# Patient Record
Sex: FEMALE | Race: BLACK OR AFRICAN AMERICAN | NOT HISPANIC OR LATINO | Employment: FULL TIME | URBAN - METROPOLITAN AREA
[De-identification: names, ages, dates, MRNs, and addresses within clinical notes are randomized per-mention and may not be internally consistent; named-entity substitution may affect disease eponyms.]

---

## 2024-05-31 RX ORDER — FEXOFENADINE HCL 60 MG
TABLET ORAL
COMMUNITY
Start: 2021-02-05 | End: 2024-05-31 | Stop reason: ALTCHOICE

## 2024-05-31 RX ORDER — PANTOPRAZOLE SODIUM 40 MG/1
1 TABLET, DELAYED RELEASE ORAL DAILY
COMMUNITY

## 2024-05-31 RX ORDER — CETIRIZINE HYDROCHLORIDE 10 MG/1
10 TABLET ORAL AS NEEDED
COMMUNITY

## 2024-05-31 RX ORDER — MONTELUKAST SODIUM 4 MG/1
1 TABLET, CHEWABLE ORAL 2 TIMES DAILY
COMMUNITY
Start: 2023-06-05

## 2024-05-31 RX ORDER — ETONOGESTREL AND ETHINYL ESTRADIOL VAGINAL RING .015; .12 MG/D; MG/D
RING VAGINAL
COMMUNITY
Start: 2024-02-08

## 2024-05-31 RX ORDER — FLUTICASONE PROPIONATE 50 MCG
2 SPRAY, SUSPENSION (ML) NASAL DAILY
COMMUNITY

## 2024-07-17 ENCOUNTER — TELEPHONE (OUTPATIENT)
Dept: PRIMARY CARE | Facility: CLINIC | Age: 39
End: 2024-07-17

## 2024-07-17 ENCOUNTER — TELEMEDICINE (OUTPATIENT)
Dept: PRIMARY CARE | Facility: CLINIC | Age: 39
End: 2024-07-17
Payer: COMMERCIAL

## 2024-07-17 DIAGNOSIS — L50.9 URTICARIA OF ENTIRE BODY: Primary | ICD-10-CM

## 2024-07-17 DIAGNOSIS — K21.00 GASTROESOPHAGEAL REFLUX DISEASE WITH ESOPHAGITIS WITHOUT HEMORRHAGE: Chronic | ICD-10-CM

## 2024-07-17 PROBLEM — J30.9 CHRONIC ALLERGIC RHINITIS: Status: ACTIVE | Noted: 2024-07-17

## 2024-07-17 PROBLEM — K91.89 POSTCHOLECYSTECTOMY DIARRHEA: Status: ACTIVE | Noted: 2024-07-17

## 2024-07-17 PROBLEM — R19.7 POSTCHOLECYSTECTOMY DIARRHEA: Status: ACTIVE | Noted: 2024-07-17

## 2024-07-17 PROCEDURE — 99203 OFFICE O/P NEW LOW 30 MIN: CPT | Performed by: INTERNAL MEDICINE

## 2024-07-17 RX ORDER — METHYLPREDNISOLONE 4 MG/1
TABLET ORAL
Qty: 21 TABLET | Refills: 0 | Status: SHIPPED | OUTPATIENT
Start: 2024-07-17

## 2024-07-17 ASSESSMENT — COLUMBIA-SUICIDE SEVERITY RATING SCALE - C-SSRS
6. HAVE YOU EVER DONE ANYTHING, STARTED TO DO ANYTHING, OR PREPARED TO DO ANYTHING TO END YOUR LIFE?: NO
1. IN THE PAST MONTH, HAVE YOU WISHED YOU WERE DEAD OR WISHED YOU COULD GO TO SLEEP AND NOT WAKE UP?: NO
2. HAVE YOU ACTUALLY HAD ANY THOUGHTS OF KILLING YOURSELF?: NO

## 2024-07-17 ASSESSMENT — PATIENT HEALTH QUESTIONNAIRE - PHQ9
SUM OF ALL RESPONSES TO PHQ9 QUESTIONS 1 AND 2: 0
2. FEELING DOWN, DEPRESSED OR HOPELESS: NOT AT ALL
2. FEELING DOWN, DEPRESSED OR HOPELESS: NOT AT ALL
SUM OF ALL RESPONSES TO PHQ9 QUESTIONS 1 AND 2: 0
1. LITTLE INTEREST OR PLEASURE IN DOING THINGS: NOT AT ALL
1. LITTLE INTEREST OR PLEASURE IN DOING THINGS: NOT AT ALL

## 2024-07-17 ASSESSMENT — ENCOUNTER SYMPTOMS
LOSS OF SENSATION IN FEET: 0
TROUBLE SWALLOWING: 0
CHILLS: 1
DIARRHEA: 0
VOICE CHANGE: 0
OCCASIONAL FEELINGS OF UNSTEADINESS: 0
NAUSEA: 0
FEVER: 0
DEPRESSION: 0

## 2024-07-17 NOTE — ASSESSMENT & PLAN NOTE
Medication spironolactone 25 mg  Take one half tablet by mouth daily  Last office visit 3/12/21  Last Refill 9/15/20  Next office visit 9/14/21  Labs 3/12/21    Refilled    Unlcear ethiology , post infection or food related, treatment with steroid, and h2  and H1 anti histamminics

## 2024-07-17 NOTE — PROGRESS NOTES
Subjective   Patient ID: Makenzie Vu is a 38 y.o. female who presents for covid postive. With hives    HPI 4 days ago started with sore throat, cough, headache, nasal congestion, tested for covid 2 days ago. Last night she observed 3 hives on right arm, today she observed also in other limbs, , abdominal wall and few in her back. Scalp is itching as well.  She was out of town, dinning out frequently , more spicy and new flavors food, return last night.   She feels currently better from covid symptoms, .  OTC mucinex.    Review of Systems   Constitutional:  Positive for chills. Negative for fever.   HENT:  Negative for trouble swallowing and voice change.    Gastrointestinal:  Negative for diarrhea and nausea.   All other systems reviewed and are negative.      Objective   There were no vitals taken for this visit.    Physical Exam  Oriented x 3 able to provide all medical information no acute respiratory distress   Assessment/Plan   Problem List Items Addressed This Visit             ICD-10-CM    Urticaria of entire body - Primary L50.9     Unlcear ethiology , post infection or food related, treatment with steroid, and h2  and H1 anti histamminics         Relevant Medications    methylPREDNISolone (Medrol Dospak) 4 mg tablets     Other Visit Diagnoses         Codes    Gastroesophageal reflux disease with esophagitis without hemorrhage  (Chronic)    K21.00               
Ambulatory

## 2024-07-17 NOTE — PATIENT INSTRUCTIONS
Star prednisone with food and early in the day, at bed time take  25 to 50 mg of benadryl , and along with your pantorprazole, you also take pepcid ac for about 1 week. Call office as needed. We will see you in September for physical

## 2024-09-13 ENCOUNTER — APPOINTMENT (OUTPATIENT)
Dept: PRIMARY CARE | Facility: CLINIC | Age: 39
End: 2024-09-13
Payer: COMMERCIAL

## 2024-09-23 ASSESSMENT — PROMIS GLOBAL HEALTH SCALE
RATE_AVERAGE_FATIGUE: MODERATE
RATE_GENERAL_HEALTH: GOOD
EMOTIONAL_PROBLEMS: RARELY
RATE_PHYSICAL_HEALTH: GOOD
RATE_QUALITY_OF_LIFE: VERY GOOD
RATE_AVERAGE_PAIN: 1
RATE_MENTAL_HEALTH: VERY GOOD
RATE_SOCIAL_SATISFACTION: VERY GOOD
CARRYOUT_PHYSICAL_ACTIVITIES: COMPLETELY
CARRYOUT_SOCIAL_ACTIVITIES: VERY GOOD

## 2024-09-24 ENCOUNTER — APPOINTMENT (OUTPATIENT)
Dept: PRIMARY CARE | Facility: CLINIC | Age: 39
End: 2024-09-24
Payer: COMMERCIAL

## 2024-09-24 ENCOUNTER — LAB (OUTPATIENT)
Dept: LAB | Facility: LAB | Age: 39
End: 2024-09-24
Payer: COMMERCIAL

## 2024-09-24 VITALS
DIASTOLIC BLOOD PRESSURE: 60 MMHG | HEART RATE: 60 BPM | SYSTOLIC BLOOD PRESSURE: 112 MMHG | WEIGHT: 192.68 LBS | OXYGEN SATURATION: 100 % | BODY MASS INDEX: 34.14 KG/M2 | RESPIRATION RATE: 16 BRPM | HEIGHT: 63 IN

## 2024-09-24 DIAGNOSIS — R19.7 POSTCHOLECYSTECTOMY DIARRHEA: Primary | ICD-10-CM

## 2024-09-24 DIAGNOSIS — G93.89 FRONTAL MASS OF BRAIN: ICD-10-CM

## 2024-09-24 DIAGNOSIS — G93.89 FRONTAL MASS OF BRAIN: Primary | ICD-10-CM

## 2024-09-24 DIAGNOSIS — F40.240 CLAUSTROPHOBIA: ICD-10-CM

## 2024-09-24 DIAGNOSIS — K91.89 POSTCHOLECYSTECTOMY DIARRHEA: Primary | ICD-10-CM

## 2024-09-24 DIAGNOSIS — Z00.00 PHYSICAL EXAM, ANNUAL: ICD-10-CM

## 2024-09-24 DIAGNOSIS — K21.00 GASTROESOPHAGEAL REFLUX DISEASE WITH ESOPHAGITIS WITHOUT HEMORRHAGE: ICD-10-CM

## 2024-09-24 LAB
25(OH)D3 SERPL-MCNC: 64 NG/ML (ref 30–100)
ALBUMIN SERPL BCP-MCNC: 3.8 G/DL (ref 3.4–5)
ALP SERPL-CCNC: 68 U/L (ref 33–110)
ALT SERPL W P-5'-P-CCNC: 49 U/L (ref 7–45)
ANION GAP SERPL CALC-SCNC: 13 MMOL/L (ref 10–20)
AST SERPL W P-5'-P-CCNC: 36 U/L (ref 9–39)
BILIRUB SERPL-MCNC: 0.4 MG/DL (ref 0–1.2)
BUN SERPL-MCNC: 9 MG/DL (ref 6–23)
CALCIUM SERPL-MCNC: 9.2 MG/DL (ref 8.6–10.6)
CHLORIDE SERPL-SCNC: 108 MMOL/L (ref 98–107)
CHOLEST SERPL-MCNC: 172 MG/DL (ref 0–199)
CHOLESTEROL/HDL RATIO: 2.4
CO2 SERPL-SCNC: 24 MMOL/L (ref 21–32)
CREAT SERPL-MCNC: 0.89 MG/DL (ref 0.5–1.05)
EGFRCR SERPLBLD CKD-EPI 2021: 85 ML/MIN/1.73M*2
ERYTHROCYTE [DISTWIDTH] IN BLOOD BY AUTOMATED COUNT: 14.3 % (ref 11.5–14.5)
EST. AVERAGE GLUCOSE BLD GHB EST-MCNC: 111 MG/DL
GLUCOSE SERPL-MCNC: 87 MG/DL (ref 74–99)
HBA1C MFR BLD: 5.5 %
HCT VFR BLD AUTO: 42.7 % (ref 36–46)
HDLC SERPL-MCNC: 70.7 MG/DL
HGB BLD-MCNC: 13.3 G/DL (ref 12–16)
LDLC SERPL CALC-MCNC: 81 MG/DL
MCH RBC QN AUTO: 26.2 PG (ref 26–34)
MCHC RBC AUTO-ENTMCNC: 31.1 G/DL (ref 32–36)
MCV RBC AUTO: 84 FL (ref 80–100)
NON HDL CHOLESTEROL: 101 MG/DL (ref 0–149)
NRBC BLD-RTO: 0 /100 WBCS (ref 0–0)
PLATELET # BLD AUTO: 260 X10*3/UL (ref 150–450)
POTASSIUM SERPL-SCNC: 4.4 MMOL/L (ref 3.5–5.3)
PROT SERPL-MCNC: 7.3 G/DL (ref 6.4–8.2)
RBC # BLD AUTO: 5.08 X10*6/UL (ref 4–5.2)
SODIUM SERPL-SCNC: 141 MMOL/L (ref 136–145)
TRIGL SERPL-MCNC: 104 MG/DL (ref 0–149)
TSH SERPL-ACNC: 0.57 MIU/L (ref 0.44–3.98)
VLDL: 21 MG/DL (ref 0–40)
WBC # BLD AUTO: 5.7 X10*3/UL (ref 4.4–11.3)

## 2024-09-24 PROCEDURE — 80061 LIPID PANEL: CPT

## 2024-09-24 PROCEDURE — 80053 COMPREHEN METABOLIC PANEL: CPT

## 2024-09-24 PROCEDURE — 84443 ASSAY THYROID STIM HORMONE: CPT

## 2024-09-24 PROCEDURE — 85027 COMPLETE CBC AUTOMATED: CPT

## 2024-09-24 PROCEDURE — 36415 COLL VENOUS BLD VENIPUNCTURE: CPT

## 2024-09-24 PROCEDURE — 1036F TOBACCO NON-USER: CPT | Performed by: INTERNAL MEDICINE

## 2024-09-24 PROCEDURE — 99395 PREV VISIT EST AGE 18-39: CPT | Performed by: INTERNAL MEDICINE

## 2024-09-24 PROCEDURE — 3008F BODY MASS INDEX DOCD: CPT | Performed by: INTERNAL MEDICINE

## 2024-09-24 PROCEDURE — 82306 VITAMIN D 25 HYDROXY: CPT

## 2024-09-24 PROCEDURE — 90471 IMMUNIZATION ADMIN: CPT | Performed by: INTERNAL MEDICINE

## 2024-09-24 PROCEDURE — 83036 HEMOGLOBIN GLYCOSYLATED A1C: CPT

## 2024-09-24 PROCEDURE — 90656 IIV3 VACC NO PRSV 0.5 ML IM: CPT | Performed by: INTERNAL MEDICINE

## 2024-09-24 RX ORDER — LORAZEPAM 0.5 MG/1
TABLET ORAL
Qty: 2 TABLET | Refills: 0 | Status: SHIPPED | OUTPATIENT
Start: 2024-09-24

## 2024-09-24 RX ORDER — FAMOTIDINE 20 MG/1
TABLET, FILM COATED ORAL
Qty: 30 TABLET | Refills: 3 | Status: SHIPPED | OUTPATIENT
Start: 2024-09-24 | End: 2024-09-24

## 2024-09-24 RX ORDER — FAMOTIDINE 20 MG/1
TABLET, FILM COATED ORAL
Qty: 90 TABLET | Refills: 1 | Status: SHIPPED | OUTPATIENT
Start: 2024-09-24

## 2024-09-24 RX ORDER — FAMOTIDINE 20 MG/1
TABLET, FILM COATED ORAL
Qty: 60 TABLET | Refills: 1 | Status: SHIPPED | OUTPATIENT
Start: 2024-09-24 | End: 2024-09-24 | Stop reason: SDUPTHER

## 2024-09-24 ASSESSMENT — ENCOUNTER SYMPTOMS
WHEEZING: 0
OCCASIONAL FEELINGS OF UNSTEADINESS: 0
PALPITATIONS: 0
CHEST TIGHTNESS: 0
ARTHRALGIAS: 0
BLOOD IN STOOL: 0
DEPRESSION: 0
LOSS OF SENSATION IN FEET: 0

## 2024-09-24 ASSESSMENT — COLUMBIA-SUICIDE SEVERITY RATING SCALE - C-SSRS
2. HAVE YOU ACTUALLY HAD ANY THOUGHTS OF KILLING YOURSELF?: NO
6. HAVE YOU EVER DONE ANYTHING, STARTED TO DO ANYTHING, OR PREPARED TO DO ANYTHING TO END YOUR LIFE?: NO
1. IN THE PAST MONTH, HAVE YOU WISHED YOU WERE DEAD OR WISHED YOU COULD GO TO SLEEP AND NOT WAKE UP?: NO

## 2024-09-24 ASSESSMENT — PATIENT HEALTH QUESTIONNAIRE - PHQ9
SUM OF ALL RESPONSES TO PHQ9 QUESTIONS 1 AND 2: 0
1. LITTLE INTEREST OR PLEASURE IN DOING THINGS: NOT AT ALL
2. FEELING DOWN, DEPRESSED OR HOPELESS: NOT AT ALL

## 2024-09-24 NOTE — PATIENT INSTRUCTIONS
Increase vegetables daily. Keep working on reduction of sweets.Please avoid dehydration, be consistent with your bed time very day of the week, check and avoid any food that may trigger headaches, maintain average 70 oz of water, complete eye exam soon, see gastroenterologist, today you received flu vaccine, return here in 4 months.     none

## 2024-09-24 NOTE — ASSESSMENT & PLAN NOTE
Discussed diet, exercise, immunizations, and screening appropriate for their age.  Mammogram: never  PAP: Maral Choudhury

## 2024-09-24 NOTE — ASSESSMENT & PLAN NOTE
>>ASSESSMENT AND PLAN FOR FRONTAL MASS OF BRAIN WRITTEN ON 9/24/2024  2:27 PM BY VIKTOR YO MD    MRI brain with and without ordered.

## 2024-09-24 NOTE — PROGRESS NOTES
"Subjective   Patient ID: Makenzie Vu is a 39 y.o. female who presents for Annual Exam., new patient    HPI She eats 3 meals, school day greek yogurt/granola, summer morning egg/pancakes, lunch is pack for school year with salad/protein, or left overs or turkey sanwich with fruits as snacks, Dinner home cooking for her protein ( chicken, ocassionally pork or beef), vegetables, starch,  3 to 4 and dinning out fast food with chicken sandwich. Fresh vegetables/salad one serving/ week. Sweets recently less amount trying to get to only 2 x week. ETOH 2 x week , more during summer time.  Exercises with weights at home, about 30 min plus she walks at work , about 5,000 steps daily.  Her urticaria related to covid resolved w steroid and none since mid July.  Last few months she started with more indigestion, more heartburn, bm less frequent, she link to use of colestipol, She also uses pantoprazole daily and PRN pepcid/tums.    Review of Systems   HENT:          Well control rhinorrea with fluticasone year around   Respiratory:  Negative for chest tightness and wheezing.    Cardiovascular:  Negative for chest pain and palpitations.   Gastrointestinal:  Negative for blood in stool.   Musculoskeletal:  Negative for arthralgias.   Neurological:         2 x week tensional headache, some times wakes up with or while at work. Uses acetaminophen , second option excedrin migraine. However she recalls over 10 years ago intense migraines while working in very stressful environment and MRI of brain was done, with some abnormal findings. She never return for follow up.   Psychiatric/Behavioral:          Sleep interrupted during working days due to daughter poor sleep   All other systems reviewed and are negative.      Objective   /60 (BP Location: Left arm, Patient Position: Sitting, BP Cuff Size: Adult)   Pulse 60   Resp 16   Ht 1.592 m (5' 2.67\")   Wt 87.4 kg (192 lb 10.9 oz)   SpO2 100%   BMI 34.49 kg/m²     Physical " Exam  Eyes - conjunctivae clear, PERRLA  HEENT - no impacted wax  Neck - no cervical lymphadenopathy, no thyromegaly  Axilla - no palpable lymphadenopathy  Cardiac- regular rate and rhythm, no murmurs, no carotid bruit, no JVP  Lung - clear to auscultation, no rales, no rhonchi, no wheezing  GI - normally active bowel sounds, non tender, non distended, no hepatosplenomegaly, no rebound  MSK - non deformities  Extremities - no edema, good distal pulses  Neuro - non focal, oriented x 3  Skin - no bruises, no rashes  Psychiatric - pleasant, well groom, no hallucinations    Assessment/Plan   Problem List Items Addressed This Visit             ICD-10-CM    Postcholecystectomy diarrhea - Primary K91.89, R19.7     Advice to restart cholestiramine at least one dose every other day, pending GI consult         Relevant Orders    Referral to Gastroenterology    Physical exam, annual Z00.00     Discussed diet, exercise, immunizations, and screening appropriate for their age.  Mammogram: never  PAP: Maral Choudhury           Relevant Orders    Referral to Ophthalmology    CBC    Hemoglobin A1C    Comprehensive Metabolic Panel    Lipid Panel    TSH with reflex to Free T4 if abnormal    Vitamin D 25-Hydroxy,Total (for eval of Vitamin D levels)    Gastroesophageal reflux disease with esophagitis without hemorrhage K21.00     Advice to add bed time famotidine 20, continue pantoprazole, PRN tums while seeing Gi         Frontal mass of brain G93.89     MRI brain with and without ordered.          Other Visit Diagnoses         Codes    Claustrophobia     F40.240    Relevant Medications    LORazepam (Ativan) 0.5 mg tablet

## 2024-10-02 ENCOUNTER — TELEPHONE (OUTPATIENT)
Dept: PRIMARY CARE | Facility: CLINIC | Age: 39
End: 2024-10-02
Payer: COMMERCIAL

## 2024-10-11 ENCOUNTER — APPOINTMENT (OUTPATIENT)
Dept: RADIOLOGY | Facility: HOSPITAL | Age: 39
End: 2024-10-11
Payer: COMMERCIAL

## 2024-11-08 ENCOUNTER — APPOINTMENT (OUTPATIENT)
Dept: RADIOLOGY | Facility: HOSPITAL | Age: 39
End: 2024-11-08
Payer: COMMERCIAL

## 2024-12-02 ENCOUNTER — APPOINTMENT (OUTPATIENT)
Dept: RADIOLOGY | Facility: HOSPITAL | Age: 39
End: 2024-12-02
Payer: COMMERCIAL

## 2024-12-12 DIAGNOSIS — K21.00 GASTROESOPHAGEAL REFLUX DISEASE WITH ESOPHAGITIS WITHOUT HEMORRHAGE: Primary | ICD-10-CM

## 2024-12-12 RX ORDER — PANTOPRAZOLE SODIUM 40 MG/1
40 TABLET, DELAYED RELEASE ORAL DAILY
Qty: 90 TABLET | Refills: 1 | Status: SHIPPED | OUTPATIENT
Start: 2024-12-12

## 2024-12-30 ENCOUNTER — HOSPITAL ENCOUNTER (OUTPATIENT)
Dept: RADIOLOGY | Facility: HOSPITAL | Age: 39
Discharge: HOME | End: 2024-12-30
Payer: COMMERCIAL

## 2024-12-30 DIAGNOSIS — G93.89 FRONTAL MASS OF BRAIN: ICD-10-CM

## 2024-12-30 PROCEDURE — A9575 INJ GADOTERATE MEGLUMI 0.1ML: HCPCS | Performed by: INTERNAL MEDICINE

## 2024-12-30 PROCEDURE — 2550000001 HC RX 255 CONTRASTS: Performed by: INTERNAL MEDICINE

## 2024-12-30 PROCEDURE — 70553 MRI BRAIN STEM W/O & W/DYE: CPT

## 2024-12-30 PROCEDURE — 70553 MRI BRAIN STEM W/O & W/DYE: CPT | Performed by: RADIOLOGY

## 2024-12-30 RX ORDER — GADOTERATE MEGLUMINE 376.9 MG/ML
18 INJECTION INTRAVENOUS
Status: COMPLETED | OUTPATIENT
Start: 2024-12-30 | End: 2024-12-30

## 2024-12-30 RX ADMIN — GADOTERATE MEGLUMINE 18 ML: 376.9 INJECTION INTRAVENOUS at 12:43

## 2025-01-13 ENCOUNTER — APPOINTMENT (OUTPATIENT)
Dept: PRIMARY CARE | Facility: CLINIC | Age: 40
End: 2025-01-13
Payer: COMMERCIAL

## 2025-01-13 ENCOUNTER — LAB (OUTPATIENT)
Dept: LAB | Facility: LAB | Age: 40
End: 2025-01-13
Payer: COMMERCIAL

## 2025-01-13 VITALS
OXYGEN SATURATION: 96 % | BODY MASS INDEX: 36.15 KG/M2 | HEIGHT: 62 IN | HEART RATE: 69 BPM | RESPIRATION RATE: 16 BRPM | WEIGHT: 196.43 LBS | SYSTOLIC BLOOD PRESSURE: 108 MMHG | DIASTOLIC BLOOD PRESSURE: 72 MMHG

## 2025-01-13 DIAGNOSIS — R79.89 ABNORMAL LFTS: Primary | ICD-10-CM

## 2025-01-13 DIAGNOSIS — M85.00 BONE FIBROUS DYSPLASIA: ICD-10-CM

## 2025-01-13 DIAGNOSIS — N30.00 ACUTE CYSTITIS WITHOUT HEMATURIA: ICD-10-CM

## 2025-01-13 DIAGNOSIS — R79.89 ABNORMAL LFTS: ICD-10-CM

## 2025-01-13 DIAGNOSIS — K21.00 GASTROESOPHAGEAL REFLUX DISEASE WITH ESOPHAGITIS WITHOUT HEMORRHAGE: ICD-10-CM

## 2025-01-13 PROBLEM — K91.89 POSTCHOLECYSTECTOMY DIARRHEA: Status: RESOLVED | Noted: 2024-07-17 | Resolved: 2025-01-13

## 2025-01-13 PROBLEM — R19.7 POSTCHOLECYSTECTOMY DIARRHEA: Status: RESOLVED | Noted: 2024-07-17 | Resolved: 2025-01-13

## 2025-01-13 LAB
ALBUMIN SERPL BCP-MCNC: 4.2 G/DL (ref 3.4–5)
ALP SERPL-CCNC: 73 U/L (ref 33–110)
ALT SERPL W P-5'-P-CCNC: 28 U/L (ref 7–45)
AST SERPL W P-5'-P-CCNC: 22 U/L (ref 9–39)
BILIRUB DIRECT SERPL-MCNC: 0.1 MG/DL (ref 0–0.3)
BILIRUB SERPL-MCNC: 0.3 MG/DL (ref 0–1.2)
PROT SERPL-MCNC: 7.8 G/DL (ref 6.4–8.2)

## 2025-01-13 PROCEDURE — 99214 OFFICE O/P EST MOD 30 MIN: CPT | Performed by: INTERNAL MEDICINE

## 2025-01-13 PROCEDURE — 87086 URINE CULTURE/COLONY COUNT: CPT

## 2025-01-13 PROCEDURE — 3008F BODY MASS INDEX DOCD: CPT | Performed by: INTERNAL MEDICINE

## 2025-01-13 PROCEDURE — 1036F TOBACCO NON-USER: CPT | Performed by: INTERNAL MEDICINE

## 2025-01-13 PROCEDURE — 80076 HEPATIC FUNCTION PANEL: CPT

## 2025-01-13 RX ORDER — NITROFURANTOIN 25; 75 MG/1; MG/1
100 CAPSULE ORAL 2 TIMES DAILY
Qty: 10 CAPSULE | Refills: 0 | Status: SHIPPED | OUTPATIENT
Start: 2025-01-13 | End: 2025-01-18

## 2025-01-13 ASSESSMENT — COLUMBIA-SUICIDE SEVERITY RATING SCALE - C-SSRS
2. HAVE YOU ACTUALLY HAD ANY THOUGHTS OF KILLING YOURSELF?: NO
1. IN THE PAST MONTH, HAVE YOU WISHED YOU WERE DEAD OR WISHED YOU COULD GO TO SLEEP AND NOT WAKE UP?: NO

## 2025-01-13 ASSESSMENT — ENCOUNTER SYMPTOMS
DEPRESSION: 0
FATIGUE: 0
NAUSEA: 0
LOSS OF SENSATION IN FEET: 0
FEVER: 0
CHILLS: 0
CONSTIPATION: 0
OCCASIONAL FEELINGS OF UNSTEADINESS: 0

## 2025-01-13 ASSESSMENT — PATIENT HEALTH QUESTIONNAIRE - PHQ9
2. FEELING DOWN, DEPRESSED OR HOPELESS: NOT AT ALL
1. LITTLE INTEREST OR PLEASURE IN DOING THINGS: NOT AT ALL
SUM OF ALL RESPONSES TO PHQ9 QUESTIONS 1 AND 2: 0

## 2025-01-13 NOTE — PROGRESS NOTES
"Subjective   Patient ID: Makenzie Vu is a 39 y.o. female who presents for GERD.Results    HPI She is taking pantoprazole in empty stomach in am and famotidine at bed time 2 hours after dinner, she is using tums less than before, but she still has symptoms retrosternal burning are still present almosts daily  Foods that  trigger are garlic, pepper,Citrus, grapes, strawberries, pinneaples,   We review MRI brain again with stable possible fibrous dysplasia.  Labs from physical with borderline elevated alt  She started last night with odor in urine, dysuria, pelvic pain, she took azo one dose this am.   She has reduced colestipol to one dose daily to avoid constipation  Review of Systems   Constitutional:  Negative for chills, fatigue and fever.   Gastrointestinal:  Negative for constipation and nausea.   All other systems reviewed and are negative.      Objective   /72   Pulse 69   Resp 16   Ht 1.575 m (5' 2\")   Wt 89.1 kg (196 lb 6.9 oz)   SpO2 96%   BMI 35.93 kg/m²     Physical Exam  Eyes- Conjunctiva clear  Neck-no thyromegaly  Cardiac- regular rate and rhythm, no murmurs  Lung- clear to auscultation  GI- present  bowel sounds, suprapubic mild tender, no rebound, no flank tenderness  MSK- no deformities  Extremities- no edema, good distal pulses  Neuro- non focal, oriented x 3  Psychiatric- pleasant, well groomed, no hallucinations    Assessment/Plan   Problem List Items Addressed This Visit             ICD-10-CM    Gastroesophageal reflux disease with esophagitis without hemorrhage K21.00     Continue current meds, GI consult coming up         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

## 2025-01-13 NOTE — PATIENT INSTRUCTIONS
Continue current meds, and discuss with gastroenterologist further , today  complete blood and urine for further advice, take antibiotic for 5 days, return for physical

## 2025-01-14 LAB — BACTERIA UR CULT: NORMAL

## 2025-01-21 ENCOUNTER — TELEPHONE (OUTPATIENT)
Dept: PRIMARY CARE | Facility: CLINIC | Age: 40
End: 2025-01-21

## 2025-01-21 ENCOUNTER — APPOINTMENT (OUTPATIENT)
Dept: GASTROENTEROLOGY | Facility: CLINIC | Age: 40
End: 2025-01-21
Payer: COMMERCIAL

## 2025-01-21 ENCOUNTER — HOSPITAL ENCOUNTER (OUTPATIENT)
Dept: RADIOLOGY | Facility: CLINIC | Age: 40
Discharge: HOME | End: 2025-01-21
Payer: COMMERCIAL

## 2025-01-21 DIAGNOSIS — R09.A2 GLOBUS SENSATION: ICD-10-CM

## 2025-01-21 DIAGNOSIS — K91.89 POSTCHOLECYSTECTOMY DIARRHEA: ICD-10-CM

## 2025-01-21 DIAGNOSIS — K59.00 CONSTIPATION, UNSPECIFIED CONSTIPATION TYPE: ICD-10-CM

## 2025-01-21 DIAGNOSIS — R19.7 POSTCHOLECYSTECTOMY DIARRHEA: ICD-10-CM

## 2025-01-21 DIAGNOSIS — K21.9 GASTROESOPHAGEAL REFLUX DISEASE WITHOUT ESOPHAGITIS: Primary | ICD-10-CM

## 2025-01-21 PROCEDURE — 74019 RADEX ABDOMEN 2 VIEWS: CPT

## 2025-01-21 PROCEDURE — 99204 OFFICE O/P NEW MOD 45 MIN: CPT | Performed by: INTERNAL MEDICINE

## 2025-01-21 PROCEDURE — 74019 RADEX ABDOMEN 2 VIEWS: CPT | Performed by: RADIOLOGY

## 2025-01-21 ASSESSMENT — ENCOUNTER SYMPTOMS
HEADACHES: 1
RESPIRATORY NEGATIVE: 1
CONSTITUTIONAL NEGATIVE: 1
CARDIOVASCULAR NEGATIVE: 1

## 2025-01-21 NOTE — PROGRESS NOTES
Subjective     History of Present Illness:     40yo with GERD, tension HA, s/p cholecystectomy 2018 seen for evaluation.  Has been taking pantoprazole daily and famotidine at bedtime. Despite this continues to have retrosternal burning daily, triggered by garlic , peper, citrus, strawberries, pineapples.  Chronic indigestion, reflux. Has had endoscopy many yrs ago , was normal. Placed on pantoprazole . Also on famotidine 20 mg at bedtime.    Sx daily, will take TUMS 5 days per week.  Sx feeling of need to belch but unable, globus sensation as well. At times heartburn.  No dysphagia /odynophagia.     On colestipol for diarrhea after cholecystectomy. 4-5/day of week.   Rare straining, thinks emptying adequately.  Was taking more often right after gallbladder removed however has been better.  No blood per rectum  Weight stable  + caffeine 2 cups per day, + chocolate       PMH  GERD    Soc hx;  No tob,illicits, occasional etoh    Family hx; HTN  DL      Review of Systems  Review of Systems   Constitutional: Negative.    Respiratory: Negative.     Cardiovascular: Negative.    Genitourinary: Negative.    Neurological:  Positive for headaches.       Past Medical History  Past Medical History:   Diagnosis Date    Personal history of other diseases of the digestive system 01/25/2017    History of esophageal reflux    Postcholecystectomy diarrhea 07/17/2024       Social History  Social History     Tobacco Use    Smoking status: Never    Smokeless tobacco: Never   Vaping Use    Vaping status: Never Used   Substance Use Topics    Alcohol use: Yes     Comment: social    Drug use: Never       Family History  No family history on file.     Allergies  Allergies   Allergen Reactions    Cephalexin Hives    Sulfamethoxazole-Trimethoprim Swelling and Itching       Medications  Current Outpatient Medications   Medication Instructions    cetirizine (ZYRTEC) 10 mg, As needed    colestipol (Colestid) 1 gram tablet 1 tablet, oral, 2 times  daily, Take 1 tablet q day.    etonogestreL-ethinyl estradioL (Haloette) 0.12-0.015 mg/24 hr vaginal ring USE 1 AS DIRECTED. LEAVE IN PLACE FOR FOUR WEEKS, THEN REMOVE AND REPLACE IMMEDIATELY.    famotidine (Pepcid) 20 mg tablet TAKE 1 TABLET BY MOUTH AT BEDTIME ON AN EMPTY STOMACH    fluticasone (Flonase) 50 mcg/actuation nasal spray 2 sprays, Daily    pantoprazole (PROTONIX) 40 mg, oral, Daily        Objective   There were no vitals taken for this visit.   Physical Exam  Cardiovascular:      Rate and Rhythm: Normal rate and regular rhythm.   Pulmonary:      Effort: Pulmonary effort is normal. No respiratory distress.      Breath sounds: Normal breath sounds. No wheezing.   Abdominal:      General: Bowel sounds are normal. There is no distension.      Palpations: Abdomen is soft. There is no mass.      Tenderness: There is no abdominal tenderness.   Neurological:      Mental Status: She is alert.               Assessment/Plan       40yo with GERD, tension HA, s/p cholecystectomy 2018 seen for evaluation of persistent reflux despite medication . Longstanding reflux that was initially controlled until gallbladder removed in 2018.  Reports globus and belching that responds to TUMS.  No rectal bleeding. No clear dysphagia. Recommend EGD to evaluate for erosive disease, hiatal hernia, H pylori and EOE.  Counseled on minimizing gerd trigger foods.  Will check Kub to ensure adequate emptying as well. May need ph impedence testing if neg egd and no improvement with maximum ppi therapy.    Rec   Check KUB  Cont ppi, h2 blocker  Schedule EGD  Use colestipol sparingly    Follow up after procedure    Anca Wilkes MD

## 2025-01-21 NOTE — TELEPHONE ENCOUNTER
Pt has taken nitrofurantoin about 8 tablets and last night observed erythema in hand, and hives I/itching in neck  she took on dose of benadryl.   Urine culture did not grow to confirm, she is asymptomatic.  Advice to continue benadryl at bedtime and zyrtec qam. Stop nitrofurantoin. Call oficce if urinary symptoms return, avoid using urinary antiseptic.

## 2025-01-22 ENCOUNTER — OFFICE VISIT (OUTPATIENT)
Dept: PRIMARY CARE | Facility: CLINIC | Age: 40
End: 2025-01-22
Payer: COMMERCIAL

## 2025-01-22 ENCOUNTER — APPOINTMENT (OUTPATIENT)
Dept: OPHTHALMOLOGY | Facility: CLINIC | Age: 40
End: 2025-01-22
Payer: COMMERCIAL

## 2025-01-22 VITALS
SYSTOLIC BLOOD PRESSURE: 118 MMHG | HEIGHT: 62 IN | WEIGHT: 193.78 LBS | RESPIRATION RATE: 16 BRPM | BODY MASS INDEX: 35.66 KG/M2 | HEART RATE: 58 BPM | DIASTOLIC BLOOD PRESSURE: 78 MMHG | OXYGEN SATURATION: 100 %

## 2025-01-22 DIAGNOSIS — G89.29 CHRONIC INTRACTABLE HEADACHE, UNSPECIFIED HEADACHE TYPE: ICD-10-CM

## 2025-01-22 DIAGNOSIS — T50.905A URTICARIA DUE TO DRUG ALLERGY: Primary | ICD-10-CM

## 2025-01-22 DIAGNOSIS — B37.31 CANDIDA VAGINITIS: ICD-10-CM

## 2025-01-22 DIAGNOSIS — H52.03 HYPEROPIA OF BOTH EYES: Primary | ICD-10-CM

## 2025-01-22 DIAGNOSIS — L50.0 URTICARIA DUE TO DRUG ALLERGY: Primary | ICD-10-CM

## 2025-01-22 DIAGNOSIS — Z00.00 PHYSICAL EXAM, ANNUAL: ICD-10-CM

## 2025-01-22 DIAGNOSIS — R51.9 CHRONIC INTRACTABLE HEADACHE, UNSPECIFIED HEADACHE TYPE: ICD-10-CM

## 2025-01-22 PROCEDURE — 3008F BODY MASS INDEX DOCD: CPT | Performed by: INTERNAL MEDICINE

## 2025-01-22 PROCEDURE — 92004 COMPRE OPH EXAM NEW PT 1/>: CPT | Performed by: STUDENT IN AN ORGANIZED HEALTH CARE EDUCATION/TRAINING PROGRAM

## 2025-01-22 PROCEDURE — 99213 OFFICE O/P EST LOW 20 MIN: CPT | Performed by: INTERNAL MEDICINE

## 2025-01-22 PROCEDURE — 1036F TOBACCO NON-USER: CPT | Performed by: INTERNAL MEDICINE

## 2025-01-22 RX ORDER — FLUCONAZOLE 150 MG/1
TABLET ORAL
Qty: 2 TABLET | Refills: 0 | Status: SHIPPED | OUTPATIENT
Start: 2025-01-22

## 2025-01-22 RX ORDER — METHYLPREDNISOLONE 4 MG/1
TABLET ORAL
Qty: 21 TABLET | Refills: 0 | Status: SHIPPED | OUTPATIENT
Start: 2025-01-22

## 2025-01-22 ASSESSMENT — REFRACTION_MANIFEST
OS_SPHERE: +0.25
METHOD_AUTOREFRACTION: 1
OD_AXIS: 150
OS_SPHERE: +0.25
OD_CYLINDER: -0.50
OS_AXIS: 040
OD_AXIS: 155
OS_AXIS: 035
OD_CYLINDER: -0.50
OS_CYLINDER: -0.25
OD_SPHERE: +0.25
OD_SPHERE: PLANO
OS_CYLINDER: -0.50

## 2025-01-22 ASSESSMENT — CONF VISUAL FIELD
OS_INFERIOR_NASAL_RESTRICTION: 0
METHOD: COUNTING FINGERS
OD_INFERIOR_NASAL_RESTRICTION: 0
OS_NORMAL: 1
OS_SUPERIOR_TEMPORAL_RESTRICTION: 0
OS_SUPERIOR_NASAL_RESTRICTION: 0
OD_SUPERIOR_NASAL_RESTRICTION: 0
OS_INFERIOR_TEMPORAL_RESTRICTION: 0
OD_INFERIOR_TEMPORAL_RESTRICTION: 0
OD_NORMAL: 1
OD_SUPERIOR_TEMPORAL_RESTRICTION: 0

## 2025-01-22 ASSESSMENT — REFRACTION
OD_SPHERE: +0.75
OS_CYLINDER: SPHERE
OS_SPHERE: +0.75
OD_CYLINDER: -0.25
OD_AXIS: 140

## 2025-01-22 ASSESSMENT — ENCOUNTER SYMPTOMS
PSYCHIATRIC NEGATIVE: 0
GASTROINTESTINAL NEGATIVE: 0
RESPIRATORY NEGATIVE: 0
MUSCULOSKELETAL NEGATIVE: 0
ABDOMINAL PAIN: 0
ALLERGIC/IMMUNOLOGIC NEGATIVE: 0
SHORTNESS OF BREATH: 0
CONSTITUTIONAL NEGATIVE: 0
TROUBLE SWALLOWING: 0
OCCASIONAL FEELINGS OF UNSTEADINESS: 0
DEPRESSION: 0
SORE THROAT: 0
CARDIOVASCULAR NEGATIVE: 0
NEUROLOGICAL NEGATIVE: 0
HEMATOLOGIC/LYMPHATIC NEGATIVE: 0
EYES NEGATIVE: 0
LOSS OF SENSATION IN FEET: 0
ENDOCRINE NEGATIVE: 0

## 2025-01-22 ASSESSMENT — COLUMBIA-SUICIDE SEVERITY RATING SCALE - C-SSRS
1. IN THE PAST MONTH, HAVE YOU WISHED YOU WERE DEAD OR WISHED YOU COULD GO TO SLEEP AND NOT WAKE UP?: NO
6. HAVE YOU EVER DONE ANYTHING, STARTED TO DO ANYTHING, OR PREPARED TO DO ANYTHING TO END YOUR LIFE?: NO
2. HAVE YOU ACTUALLY HAD ANY THOUGHTS OF KILLING YOURSELF?: NO

## 2025-01-22 ASSESSMENT — VISUAL ACUITY
METHOD: SNELLEN - LINEAR
OS_SC: 20/20
OD_SC: 20/20

## 2025-01-22 ASSESSMENT — SLIT LAMP EXAM - LIDS
COMMENTS: NORMAL
COMMENTS: NORMAL

## 2025-01-22 ASSESSMENT — TONOMETRY
OS_IOP_MMHG: 15
OD_IOP_MMHG: 15
IOP_METHOD: GOLDMANN APPLANATION

## 2025-01-22 ASSESSMENT — CUP TO DISC RATIO
OS_RATIO: .30
OD_RATIO: .30

## 2025-01-22 ASSESSMENT — EXTERNAL EXAM - RIGHT EYE: OD_EXAM: NORMAL

## 2025-01-22 ASSESSMENT — EXTERNAL EXAM - LEFT EYE: OS_EXAM: NORMAL

## 2025-01-22 NOTE — PATIENT INSTRUCTIONS
Continue benadryl in the evening, take allegra and zyrtec day time, start medrol pack. To prevent future UTI, as discussed you may remove vagina estrogen ring,  increase hydration void frequently day time, avoid constipation, void before and after intercourse.

## 2025-01-22 NOTE — PROGRESS NOTES
Assessment/Plan   Diagnoses and all orders for this visit:  Chronic intractable headache, unspecified headache type  Hyperopia of both eyes  -patient referred from PCP Dr. Rincon for ocular evaluation in setting of chronic headaches  -patient reports headaches are intermittent and at random  -no visual triggers with prolonged near work or ocular fatigue  -(-)visual aura  -patient will use OTC Tylenol or Excedrin to relieve headaches   -patient reports headaches have been occurring for many years  -recently had an MRI 9/2024  -on ocular exam today no etiology noted for headaches  -mild refractive error-spec RX not needed  -binocular exam wnl  -ocular health wnl-ONH appearance is healthy (-)edema  -continue care with PCP    RTC 1 year for annual with MAMIE and JOMAR

## 2025-01-22 NOTE — LETTER
January 22, 2025    Kristi Rincon MD  [facility], [address]  [address]   [place] [address]     Patient: Makenzie Vu   YOB: 1985   Date of Visit: 1/22/2025       Dear Dr. Kristi Rincon MD:    Thank you for referring Makenzie Vu to me for evaluation. Here is my assessment and plan of care:    Assessment/Plan:  Makenzie was seen today for headache.  Diagnoses and all orders for this visit:  Hyperopia of both eyes (Primary)  Physical exam, annual  -     Referral to Ophthalmology  Chronic intractable headache, unspecified headache type    Below you will find my full exam findings. If you have questions, please do not hesitate to call me. I look forward to following Makenzie along with you.    Chronic intractable headache, unspecified headache type  Hyperopia of both eyes  -patient referred from PCP Dr. Rincon for ocular evaluation in setting of chronic headaches  -patient reports headaches are intermittent and at random  -no visual triggers with prolonged near work or ocular fatigue  -(-)visual aura  -patient will use OTC Tylenol or Excedrin to relieve headaches   -patient reports headaches have been occurring for many years  -recently had an MRI 9/2024  -on ocular exam today no etiology noted for headaches  -mild refractive error-spec RX not needed  -binocular exam wnl  -ocular health wnl-ONH appearance is healthy (-)edema  -continue care with PCP    RTC 1 year for annual with DFE and MRX     Sincerely,        Darian Thomas, OD        CC:   No Recipients        Base Eye Exam       Visual Acuity (Snellen - Linear)         Right Left    Dist sc 20/20 20/20              Tonometry (Goldmann Applanation, 3:51 PM)         Right Left    Pressure 15 15              Pupils         Pupils    Right PERRL, No APD    Left PERRL, No APD              Visual Fields (Counting fingers)         Left Right     Full Full              Extraocular Movement         Right  Left     Full, Ortho Full, Ortho              Neuro/Psych       Oriented x3: Yes              Dilation       Both eyes: 1% Mydriacyl & 2.5% Himanshu  @ 3:51 PM                  Slit Lamp and Fundus Exam       External Exam         Right Left    External Normal Normal              Slit Lamp Exam         Right Left    Lids/Lashes Normal Normal    Conjunctiva/Sclera White and quiet White and quiet    Cornea Clear Clear    Anterior Chamber Deep and quiet Deep and quiet    Iris Round and reactive (-)NVI Round and reactive (-)NVI    Lens Clear Clear    Anterior Vitreous Normal Normal              Fundus Exam         Right Left    Disc Normal (-)NVD, (-)edema Normal (-)NVD, (-)edema    C/D Ratio .30 .30    Macula Normal Normal    Vessels Normal (-)hemes/exudates/CWS/NVE Normal (-)hemes/exudates/CWS/NVE    Periphery Normal Normal; WWOP temporal                  Refraction       Manifest Refraction (Auto)         Sphere Cylinder Axis Dist VA    Right +0.25 -0.50 155     Left +0.25 -0.50 035               Manifest Refraction #2         Sphere Cylinder [place] Dist VA    Right [place] -0.50 150 20/20    Left +0.25 -0.25 040 20/20              Cycloplegic Refraction         Sphere Cylinder Axis Dist VA    Right +0.75 -0.25 140 20/20    Left +0.75 Sphere  20/20

## 2025-01-22 NOTE — PROGRESS NOTES
"Subjective   Patient ID: Makenzie Vu is a 39 y.o. female who presents for Hives.    HPI Pt received nitrofurantoin for UTI last week, after about 8 tablets , 48 hours ago she observed erythema in hand , hives in forarms, icthy in ears , neck. Last night erythema on neck and hives/itchy in genital  area.   She stopped nitrofurantoine yesterday, however she is concern that she still hives. Taking benadryl 25 mg , that cause after several dose some drowsiness. Also takes allegra and zyrtec.     Review of Systems   HENT:  Negative for sore throat and trouble swallowing.         No lips swollen   Respiratory:  Negative for shortness of breath.    Gastrointestinal:  Negative for abdominal pain.   All other systems reviewed and are negative.      Objective   /78   Pulse 58   Resp 16   Ht 1.575 m (5' 2\")   Wt 87.9 kg (193 lb 12.6 oz)   SpO2 100%   BMI 35.44 kg/m²     Physical Exam  Skin erythema on neck, hives in posterior area of scalp, on forearms, on lower back  Eyes- Conjunctiva clear  HEENT no edema on lips, normal soft palate and tonsils.   Cardiac- regular rate and rhythm, no murmurs  Lung- clear to auscultation  GI- present  bowel sounds, nontender, no rebound  MSK- no deformities  Extremities- no edema, good distal pulses  Neuro- non focal, oriented x 3  Psychiatric- pleasant, well groomed, no hallucinations    Assessment/Plan   Problem List Items Addressed This Visit             ICD-10-CM    Urticaria due to drug allergy - Primary L50.0, T50.905A     Continue antihitaminic , add famotidine, start steroid pack.          Relevant Medications    methylPREDNISolone (Medrol Dospak) 4 mg tablets     Other Visit Diagnoses         Codes    Candida vaginitis     B37.31    Relevant Medications    fluconazole (Diflucan) 150 mg tablet               "

## 2025-02-11 ENCOUNTER — APPOINTMENT (OUTPATIENT)
Dept: GASTROENTEROLOGY | Facility: EXTERNAL LOCATION | Age: 40
End: 2025-02-11
Payer: COMMERCIAL

## 2025-02-11 DIAGNOSIS — R09.A2 GLOBUS SENSATION: ICD-10-CM

## 2025-02-11 DIAGNOSIS — K21.9 GASTROESOPHAGEAL REFLUX DISEASE WITHOUT ESOPHAGITIS: ICD-10-CM

## 2025-02-11 PROCEDURE — 88305 TISSUE EXAM BY PATHOLOGIST: CPT | Performed by: STUDENT IN AN ORGANIZED HEALTH CARE EDUCATION/TRAINING PROGRAM

## 2025-02-11 PROCEDURE — 88305 TISSUE EXAM BY PATHOLOGIST: CPT

## 2025-02-11 PROCEDURE — 43239 EGD BIOPSY SINGLE/MULTIPLE: CPT | Performed by: INTERNAL MEDICINE

## 2025-02-11 PROCEDURE — 99152 MOD SED SAME PHYS/QHP 5/>YRS: CPT | Performed by: INTERNAL MEDICINE

## 2025-02-11 PROCEDURE — 88342 IMHCHEM/IMCYTCHM 1ST ANTB: CPT | Performed by: STUDENT IN AN ORGANIZED HEALTH CARE EDUCATION/TRAINING PROGRAM

## 2025-02-11 PROCEDURE — 88342 IMHCHEM/IMCYTCHM 1ST ANTB: CPT

## 2025-02-12 ENCOUNTER — LAB REQUISITION (OUTPATIENT)
Dept: LAB | Facility: HOSPITAL | Age: 40
End: 2025-02-12
Payer: COMMERCIAL

## 2025-02-12 DIAGNOSIS — K21.9 GASTRO-ESOPHAGEAL REFLUX DISEASE WITHOUT ESOPHAGITIS: ICD-10-CM

## 2025-02-20 LAB
LAB AP ASR DISCLAIMER: NORMAL
LABORATORY COMMENT REPORT: NORMAL
PATH REPORT.FINAL DX SPEC: NORMAL
PATH REPORT.GROSS SPEC: NORMAL
PATH REPORT.RELEVANT HX SPEC: NORMAL
PATH REPORT.TOTAL CANCER: NORMAL

## 2025-02-25 DIAGNOSIS — K21.00 GASTROESOPHAGEAL REFLUX DISEASE WITH ESOPHAGITIS WITHOUT HEMORRHAGE: ICD-10-CM

## 2025-02-26 RX ORDER — PANTOPRAZOLE SODIUM 40 MG/1
40 TABLET, DELAYED RELEASE ORAL
Qty: 90 TABLET | Refills: 1 | Status: SHIPPED | OUTPATIENT
Start: 2025-02-26

## 2025-04-21 ENCOUNTER — APPOINTMENT (OUTPATIENT)
Dept: OBSTETRICS AND GYNECOLOGY | Facility: CLINIC | Age: 40
End: 2025-04-21
Payer: COMMERCIAL

## 2025-04-21 VITALS — SYSTOLIC BLOOD PRESSURE: 120 MMHG | BODY MASS INDEX: 34.93 KG/M2 | DIASTOLIC BLOOD PRESSURE: 78 MMHG | WEIGHT: 191 LBS

## 2025-04-21 DIAGNOSIS — Z12.31 VISIT FOR SCREENING MAMMOGRAM: ICD-10-CM

## 2025-04-21 DIAGNOSIS — Z87.42 HISTORY OF HEAVY VAGINAL BLEEDING: ICD-10-CM

## 2025-04-21 DIAGNOSIS — Z01.419 ENCOUNTER FOR ANNUAL ROUTINE GYNECOLOGICAL EXAMINATION: Primary | ICD-10-CM

## 2025-04-21 DIAGNOSIS — Z30.9 ENCOUNTER FOR CONTRACEPTIVE MANAGEMENT, UNSPECIFIED TYPE: ICD-10-CM

## 2025-04-21 PROCEDURE — 87624 HPV HI-RISK TYP POOLED RSLT: CPT

## 2025-04-21 PROCEDURE — 88175 CYTOPATH C/V AUTO FLUID REDO: CPT

## 2025-04-21 PROCEDURE — 99385 PREV VISIT NEW AGE 18-39: CPT | Performed by: OBSTETRICS & GYNECOLOGY

## 2025-04-21 PROCEDURE — 1036F TOBACCO NON-USER: CPT | Performed by: OBSTETRICS & GYNECOLOGY

## 2025-04-21 RX ORDER — LEVONORGESTREL AND ETHINYL ESTRADIOL 0.15-0.03
1 KIT ORAL DAILY
Qty: 91 TABLET | Refills: 3 | Status: SHIPPED | OUTPATIENT
Start: 2025-04-21 | End: 2026-07-13

## 2025-04-21 RX ORDER — LEVONORGESTREL AND ETHINYL ESTRADIOL 0.15-0.03
1 KIT ORAL DAILY
Qty: 112 TABLET | Refills: 3 | Status: SHIPPED | OUTPATIENT
Start: 2025-04-21 | End: 2025-04-21

## 2025-04-21 SDOH — ECONOMIC STABILITY: FOOD INSECURITY: WITHIN THE PAST 12 MONTHS, THE FOOD YOU BOUGHT JUST DIDN'T LAST AND YOU DIDN'T HAVE MONEY TO GET MORE.: NEVER TRUE

## 2025-04-21 SDOH — ECONOMIC STABILITY: FOOD INSECURITY: WITHIN THE PAST 12 MONTHS, YOU WORRIED THAT YOUR FOOD WOULD RUN OUT BEFORE YOU GOT MONEY TO BUY MORE.: NEVER TRUE

## 2025-04-21 SDOH — ECONOMIC STABILITY: TRANSPORTATION INSECURITY
IN THE PAST 12 MONTHS, HAS THE LACK OF TRANSPORTATION KEPT YOU FROM MEDICAL APPOINTMENTS OR FROM GETTING MEDICATIONS?: NO

## 2025-04-21 SDOH — ECONOMIC STABILITY: TRANSPORTATION INSECURITY
IN THE PAST 12 MONTHS, HAS LACK OF TRANSPORTATION KEPT YOU FROM MEETINGS, WORK, OR FROM GETTING THINGS NEEDED FOR DAILY LIVING?: NO

## 2025-04-21 ASSESSMENT — PATIENT HEALTH QUESTIONNAIRE - PHQ9
2. FEELING DOWN, DEPRESSED OR HOPELESS: NOT AT ALL
1. LITTLE INTEREST OR PLEASURE IN DOING THINGS: NOT AT ALL
SUM OF ALL RESPONSES TO PHQ9 QUESTIONS 1 & 2: 0

## 2025-04-21 NOTE — PATIENT INSTRUCTIONS
Thanks for coming in today for your annual GYN exam.      A Pap smear was sent.  Results should be available in the next few weeks.    Arrange to have a mammogram performed starting at age 40.    A prescription for a birth control pill been sent to your pharmacy.  Remember to take the pill at the same time daily. You may start the pill when you are due to replace your Nuva Ring.     Follow-up with your PCP and other healthcare specialist as needed.    Feel free to call the office with any problems, questions or concerns prior to your next visit.

## 2025-04-21 NOTE — PROGRESS NOTES
Assessment and Plan:  Makenzie Vu is a 40 y/o woman presenting today for annual GYN exam.     Diagnoses:  #1 Routine annual gynecologic exam  #2 Breast cancer screening  #3 Contraceptive management  #4 History of heavy vaginal bleeding    Assessment/Plan:  1. Annual GYN exam.  - Pap smear sent, per patient request secondary to history of previous LEEP procedure.  - Mammogram requisition provided.  - Follow up with PCP and other healthcare specialists PRN.    2. Contraceptive management - patient would like to discontinue the NuvaRing and try the birth control pill  - Prescription for Seasonale sent to pharmacy, as patient desires to skip menses.  - Briefly discussed with patient DVT risk while on the pill.  - Return to the office PRN.    Follow up with Dr. Hopkins.    Jameeibe Attestation  By signing my name below, I, Amadeo Emerson, attest that this documentation has been prepared under the direction and in the presence of Christina Hopkins MD on 2025 at 5:22 PM.     HPI:   Makenzie Vu is a 40 y/o woman presenting today for annual GYN exam. She is using the NuvaRing continuously as contraception.  She reports a history of dysmenorrhea and vomiting with menses.     Past medical hx: Negative.    Family medical hx: Mother was diagnosed with stage 0 breast cancer last year.    Surgical hx: LEEP.  section. Cholecystectomy. Odontectomy.    Social hx: No smoking, vaping, or drug use. Occasional alcohol use.    GYN hx: M10y/o, Q monthly. She reports a history of abnormal paps with positive HPV testing. No hx of STIs. Currently SA without history of sexual abuse or rape. Received Gardasil vaccination.    OB hx: .  section x2 due to prolonged labor.    ROS:  Review of Systems   Genitourinary:  Positive for menstrual problem (History of dysmenorrhea and vomiting with menses).     Physical Exam:   Physical Exam  Constitutional:       General: She is not in acute distress.     Appearance:  Normal appearance. She is obese.   Genitourinary:      Vulva exam comments: Normal appearing external female genitalia, normal Barth[place]'s and [place]'s glands bilaterally  .      Vaginal exam comments: Moist, rugated, well estrogenized, well supported.  .        Right Adnexa: not tender and no mass present.     Left Adnexa: not tender and no mass present.     Adnexa exam comments: Exam is slightly limited by habitus..      No cervical motion tenderness.      Cervical exam comments: Post-LEEP changes. Difficult to fully evaluate as it is almost flush with the cuff..      Uterus is not enlarged, fixed or tender.      No uterine mass detected.  Breasts:     Right: No inverted nipple, mass, nipple discharge or skin change.      Left: No inverted nipple, mass, nipple discharge or skin change.   HENT:      Head: Normocephalic and atraumatic.      Right Ear: External ear normal.      Left Ear: External ear normal.      Nose: Nose normal.      Mouth/Throat:      Mouth: Mucous membranes are moist.      Pharynx: Oropharynx is clear.   Eyes:      Extraocular Movements: Extraocular movements intact.      Conjunctiva/sclera: Conjunctivae normal.      Pupils: Pupils are equal, round, and reactive to light.   Neck:      Thyroid: No thyromegaly.   Cardiovascular:      Rate and Rhythm: Normal rate and regular rhythm.      Pulses: Normal pulses.      Heart sounds: Normal heart sounds.   Pulmonary:      Effort: Pulmonary effort is normal.      Breath sounds: Normal breath sounds and air entry.   Abdominal:      Palpations: Abdomen is soft.      Tenderness: There is no abdominal tenderness.   Musculoskeletal:      Cervical back: Neck supple.   Lymphadenopathy:      Upper Body:      Right upper body: No axillary adenopathy.      Left upper body: No axillary adenopathy.   Neurological:      Mental Status: She is alert and oriented to person, place, and time.      Comments: Pleasant and cooperative

## 2025-05-06 LAB
CYTOLOGY CMNT CVX/VAG CYTO-IMP: NORMAL
HPV HR 12 DNA GENITAL QL NAA+PROBE: NEGATIVE
HPV HR GENOTYPES PNL CVX NAA+PROBE: NEGATIVE
HPV16 DNA SPEC QL NAA+PROBE: NEGATIVE
HPV18 DNA SPEC QL NAA+PROBE: NEGATIVE
LAB AP HPV GENOTYPE QUESTION: YES
LAB AP HPV HR: NORMAL
LABORATORY COMMENT REPORT: NORMAL
PATH REPORT.TOTAL CANCER: NORMAL

## 2025-06-16 ENCOUNTER — TELEPHONE (OUTPATIENT)
Dept: PRIMARY CARE | Facility: CLINIC | Age: 40
End: 2025-06-16
Payer: COMMERCIAL

## 2025-06-17 ENCOUNTER — OFFICE VISIT (OUTPATIENT)
Dept: PRIMARY CARE | Facility: CLINIC | Age: 40
End: 2025-06-17
Payer: COMMERCIAL

## 2025-06-17 VITALS
BODY MASS INDEX: 35.89 KG/M2 | WEIGHT: 196.21 LBS | OXYGEN SATURATION: 98 % | DIASTOLIC BLOOD PRESSURE: 86 MMHG | SYSTOLIC BLOOD PRESSURE: 131 MMHG | HEART RATE: 72 BPM

## 2025-06-17 DIAGNOSIS — R42 CERVICAL VERTIGO: ICD-10-CM

## 2025-06-17 DIAGNOSIS — R00.2 PALPITATIONS: Primary | ICD-10-CM

## 2025-06-17 PROCEDURE — 1036F TOBACCO NON-USER: CPT | Performed by: INTERNAL MEDICINE

## 2025-06-17 PROCEDURE — 99213 OFFICE O/P EST LOW 20 MIN: CPT | Performed by: INTERNAL MEDICINE

## 2025-06-17 PROCEDURE — 93000 ELECTROCARDIOGRAM COMPLETE: CPT | Performed by: INTERNAL MEDICINE

## 2025-06-17 ASSESSMENT — ENCOUNTER SYMPTOMS
DEPRESSION: 0
LOSS OF SENSATION IN FEET: 0
OCCASIONAL FEELINGS OF UNSTEADINESS: 0
PALPITATIONS: 1

## 2025-06-17 NOTE — PROGRESS NOTES
Subjective   Patient ID: Makenzie Vu is a 39 y.o. female who presents for Palpitations.     Pt re started working out around April, howeve she was not consistent, more consistent was 2 weeks ago. Since  had 2 similar episodes , once in April and another one yesterday mid day, while she has completed weights and while doing cardio total around 35 min, saw on her apple watch showed increased about usual rates to 200, took about 45 min until rate return to lower than 100. No concomitant symptoms of chest pain, ligh headedness.  She has not eaten food yet, and she was drinking water.    Review of Systems   Cardiovascular:  Positive for palpitations.   Neurological:         If she hyperextend her neck on lying position she feels light headed, for example when getting her hair shampoo , or flat bed while being wax   All other systems reviewed and are negative.      Objective   /86   Pulse 72   Wt 89 kg (196 lb 3.4 oz)   SpO2 98%   BMI 35.89 kg/m²     Physical Exam  Eyes- Conjunctiva clear  Neck-no thyromegaly  Cardiac- regular rate and rhythm, no murmurs  Lung- clear to auscultation  GI- present  bowel sounds, nontender, no rebound  MSK- no deformities  Extremities- no edema, good distal pulses  Neuro- non focal, oriented x 3, hyperextension of c spine while sitting in exam table for over 30 sec causes mild lightheadedness  Psychiatric- pleasant, well groomed, no hallucinations    Assessment/Plan   Assessment & Plan  Palpitations  Possible related to low conditioning for intense exercise, however due to her heart rate and length of duration, will consult cardiology.  Orders:    TSH with reflex to Free T4 if abnormal; Future    CBC; Future    Magnesium; Future    Potassium; Future    Referral to Cardiology; Future    ECG 12 lead (Clinic Performed)    Cervical vertigo  Discussed to avoid that motion and avoid lying without pillow. Further eval if continues.

## 2025-06-17 NOTE — PATIENT INSTRUCTIONS
Please reduce your work out sessions to no more than 15 min/day, and alternate with brisk walking in between . See cardiologist. Avoid extending your neck and always use a pillow when you are flat. Contact office as needed before your next visit

## 2025-06-17 NOTE — ASSESSMENT & PLAN NOTE
Possible related to low conditioning for intense exercise, however due to her heart rate and length of duration, will consult cardiology.  Orders:    TSH with reflex to Free T4 if abnormal; Future    CBC; Future    Magnesium; Future    Potassium; Future    Referral to Cardiology; Future    ECG 12 lead (Clinic Performed)

## 2025-06-18 LAB
ERYTHROCYTE [DISTWIDTH] IN BLOOD BY AUTOMATED COUNT: 15.2 % (ref 11–15)
HCT VFR BLD AUTO: 44.4 % (ref 35–45)
HGB BLD-MCNC: 13.7 G/DL (ref 11.7–15.5)
MAGNESIUM SERPL-MCNC: 1.9 MG/DL (ref 1.5–2.5)
MCH RBC QN AUTO: 26.4 PG (ref 27–33)
MCHC RBC AUTO-ENTMCNC: 30.9 G/DL (ref 32–36)
MCV RBC AUTO: 85.7 FL (ref 80–100)
PLATELET # BLD AUTO: 285 THOUSAND/UL (ref 140–400)
PMV BLD REES-ECKER: 11.2 FL (ref 7.5–12.5)
POTASSIUM SERPL-SCNC: 4.2 MMOL/L (ref 3.5–5.3)
RBC # BLD AUTO: 5.18 MILLION/UL (ref 3.8–5.1)
TSH SERPL-ACNC: 1.04 MIU/L
WBC # BLD AUTO: 7.2 THOUSAND/UL (ref 3.8–10.8)

## 2025-06-19 ENCOUNTER — APPOINTMENT (OUTPATIENT)
Dept: PRIMARY CARE | Facility: CLINIC | Age: 40
End: 2025-06-19
Payer: COMMERCIAL

## 2025-07-02 ENCOUNTER — APPOINTMENT (OUTPATIENT)
Dept: CARDIOLOGY | Facility: CLINIC | Age: 40
End: 2025-07-02
Payer: COMMERCIAL

## 2025-07-02 VITALS
DIASTOLIC BLOOD PRESSURE: 78 MMHG | BODY MASS INDEX: 35.67 KG/M2 | SYSTOLIC BLOOD PRESSURE: 114 MMHG | WEIGHT: 195 LBS | OXYGEN SATURATION: 98 % | HEART RATE: 73 BPM

## 2025-07-02 DIAGNOSIS — R00.2 PALPITATIONS: ICD-10-CM

## 2025-07-02 PROCEDURE — 99204 OFFICE O/P NEW MOD 45 MIN: CPT

## 2025-07-02 PROCEDURE — 1036F TOBACCO NON-USER: CPT

## 2025-07-02 NOTE — PATIENT INSTRUCTIONS
- Stress test  - Holter monitor  - Echocardiogram  - If possible obtain an ECG during an active episode  - Instruction was provided on performing vagal maneuvers (Valsalva), which could potentially terminate an episode of arrhythmia  - Patient will follow up with me in the Cardiology office once the results are available

## 2025-07-02 NOTE — PROGRESS NOTES
Location of visit: [address]   Type of Visit: General Cardiology    Chief Complaint:  Patient was referred to Cardiology by Dr. Rincon for palpitations    History Of Present Illness:    Makenzie Vu is a 39 y.o. female, with history significant for GERD, tension HA, s/p cholecystectomy 2018 , who visits Cardiology today as a new patient  for palpitations    Referred for cardiologic evaluation due to exertional palpitations.   Per chart review: Patient reports two episodes, first in April and most recently the day prior to consultation to her PCP, both occurring during or shortly after moderate intensity workouts involving weights and cardio (approximately 35 minutes total). During the most recent episode, she noted her heart rate, as captured on Apple Watch, spiked to 200 bpm and remained elevated >100 bpm for approximately 45 minutes before spontaneously resolving; no associated chest pain or lightheadedness was noted. No syncopal episodes, and she was fasting but hydrated at the time.   Social hx: No smoking, vaping, or drug use. Occasional alcohol use.   TSH, K and Mg normal  ===================  On my interview    She is a teacher.  No family history of cardiac disease.  No smoking, occasional alcohol, no drugs,.  Exercises 4 times a week.  She states she has normal functional capacity, no prior history of any cardiac disease.  She is asked that she have had 4 episodes of palpitations.  2 of these episodes have happened while doing exercise.  She states she has very fast (up to 200) heart rate, with regular palpitations, denying any concomitant chest pain, chest pressure, lightheadedness or syncope.  These episodes last for about 30 minutes.  She have not done anything to stop these episodes.  There is no clear event that will cause these episodes of tachycardia.  Beside these episodes she also states she does not have any sort of chest pain, chest pressure, palpitations, shortness of breath,  lightheadedness, syncope.  No leg edema.  She uses contraceptive pills and she does not get her period.        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    Past Surgical History:  She has a past surgical history that includes Other surgical history (01/09/2017); Other surgical history (01/09/2017); Other surgical history (08/01/2019); Other surgical history (08/01/2019); and Other surgical history (08/01/2019).    Social History:  She reports that she has never smoked. She has never used smokeless tobacco. She reports current alcohol use. She reports that she does not use drugs.    Family History:  Family History[1]  Allergies:  Sulfamethoxazole-trimethoprim, Cephalexin, and Nitrofurantoin    Outpatient Medications:  Current Outpatient Medications   Medication Instructions    cetirizine (ZYRTEC) 10 mg, As needed    colestipol (Colestid) 1 gram tablet 1 tablet, 2 times daily    etonogestreL-ethinyl estradioL (Haloette) 0.12-0.015 mg/24 hr vaginal ring USE 1 AS DIRECTED. LEAVE IN PLACE FOR FOUR WEEKS, THEN REMOVE AND REPLACE IMMEDIATELY.    famotidine (Pepcid) 20 mg tablet TAKE 1 TABLET BY MOUTH AT BEDTIME ON AN EMPTY STOMACH    fluconazole (Diflucan) 150 mg tablet Take one tablet today and take one tablet in 7 days, total 2.    fluticasone (Flonase) 50 mcg/actuation nasal spray 2 sprays, Daily    levonorgestreL-ethinyl estrad (Seasonale) 0.15 mg-30 mcg (91) tablet 1 tablet, oral, Daily, To skip cycles.    pantoprazole (PROTONIX) 40 mg, oral, Daily before breakfast     Last Recorded Vitals:  There were no vitals filed for this visit.    Physical Exam:      6/17/2025     9:04 AM 4/21/2025    11:01 AM 1/22/2025    11:11 AM 1/13/2025    11:09 AM 9/24/2024     8:59 AM 1/3/2024     5:08 PM   Vitals   Systolic 131 120 118 108 112 122   Diastolic 86 78 78 72 60 71   BP Location     Left arm   "  Heart Rate 72  58 69 60 71   Temp      36.7 °C (98.1 °F)   Resp   16 16 16 17   Height   1.575 m (5' 2\") 1.575 m (5' 2\") 1.592 m (5' 2.67\")    Weight (lb) 196.21 191 193.78 196.43 192.68 184.97   BMI 35.89 kg/m2 34.93 kg/m2 35.44 kg/m2 35.93 kg/m2 34.49 kg/m2 34.95 kg/m2   BSA (m2) 1.97 m2 1.95 m2 1.96 m2 1.97 m2 1.97 m2 1.9 m2   Visit Report Report Report Report    Report Report Report      Wt Readings from Last 5 Encounters:   06/17/25 89 kg (196 lb 3.4 oz)   04/21/25 86.6 kg (191 lb)   01/22/25 87.9 kg (193 lb 12.6 oz)   01/13/25 89.1 kg (196 lb 6.9 oz)   09/24/24 87.4 kg (192 lb 10.9 oz)       Physical Exam  Vitals reviewed.   HENT:      Head: Normocephalic.      Mouth/Throat:      Pharynx: Oropharynx is clear.   Eyes:      Pupils: Pupils are equal, round, and reactive to light.   Cardiovascular:      Rate and Rhythm: Normal rate.      Pulses: Normal pulses.      Heart sounds: Normal heart sounds.   Pulmonary:      Effort: Pulmonary effort is normal.      Breath sounds: Normal breath sounds.   Abdominal:      General: Abdomen is flat. Bowel sounds are normal.      Palpations: Abdomen is soft.   Musculoskeletal:         General: Normal range of motion.   Skin:     General: Skin is warm and dry.   Neurological:      General: No focal deficit present.      Mental Status: She is alert.   Psychiatric:         Mood and Affect: Mood normal.            @PESEC->PESEC(CIRCULAR REFERENCE)@      Last Labs Reviewed:  CBC -  Recent Labs     06/17/25  1030 09/24/24  1043   WBC 7.2 5.7   HGB 13.7 13.3   HCT 44.4 42.7    260   MCV 85.7 84     CMP -  Recent Labs     06/17/25  1030 09/24/24  1043   NA  --  141   K 4.2 4.4   CL  --  108*   CO2  --  24   ANIONGAP  --  13   BUN  --  9   CREATININE  --  0.89   EGFR  --  85   MG 1.9  --    CALCIUM  --  9.2     Recent Labs     01/13/25  1233 09/24/24  1043   ALBUMIN 4.2 3.8   ALKPHOS 73 68   ALT 28 49*   AST 22 36   BILITOT 0.3 0.4     LIPID PANEL -   Recent Labs     " "09/24/24  1043   CHOL 172   LDLCALC 81   HDL 70.7   TRIG 104     COAGULATION PANEL -  No results for input(s): \"PTT\", \"INR\", \"HAUF\", \"DDIMERVTE\", \"HAPTOGLOBIN\", \"FIBRINOGEN\" in the last 8760 hours.  HEME/ENDO -  Recent Labs     06/17/25  1030 09/24/24  1043   TSH 1.04 0.57   HGBA1C  --  5.5     CARDIOVASCULAR  No results for input(s): \"LDH\", \"CKMB\", \"TROPHS\", \"BNP\", \"CRP\" in the last 8760 hours.    No lab exists for component: \"CK\", \"CKMBP\", \"CRPUS\", \"USCRP\"  Last Cardiology/Imaging Tests Personally Reviewed (if images available) and Interpreted:  ECG:  Encounter Date: 06/17/25   ECG 12 lead (Clinic Performed)    Narrative    Normal sinus no abnormality   ECG 12 lead (Clinic Performed) 06/17/2025    Echocardiogram:  No results found for this or any previous visit from the past 1825 days.  No results found for this or any previous visit from the past 1095 days.    Cath:  No results found for this or any previous visit from the past 1825 days.  No results found for this or any previous visit from the past 3650 days.  No results found for this or any previous visit from the past 1095 days.    Stress Test:  No results found for this or any previous visit from the past 1825 days.  No results found for this or any previous visit from the past 1095 days.    Cardiac CT/MRI:  No results found for this or any previous visit from the past 1825 days.  No results found for this or any previous visit from the past 1095 days.    Other CT:      CV RISK FACTORS:   # Hypertension: Last BP:  .  # Hyperlipidemia: Last Tchol 172 / LDL No results found for requested labs within last 365 days. / HDL 70.7 / TRIG 104 (9/24/2024: 10:43 AM).  # Type II Diabetes Mellitus: Last A1c 5.5 (9/24/2024: 10:43 AM).  # Obesity: Last BMI:  .  # CKD: Last BUN/Cr (GFR): 9/0.89 (85), 9/24/2024: 10:43 AM.    ASCV RISK:  The ASCVD Risk score (Manuel DK, et al., 2019) failed to calculate for the following reasons:    The 2019 ASCVD risk score is only valid " for ages 40 to 79    Assessment/Plan   Makenzie Vu is a 39 y.o. female, with history significant for GERD, tension HA, s/p cholecystectomy 2018 , who visits Cardiology today as a new patient  for palpitations    #Palpitations  #obesity    Assessment and plan  Makenzie is a 40 yo female with a history of GERD, tension headaches, and prior cholecystectomy (2018), presenting for cardiology evaluation due to intermittent palpitations. She reports four episodes of palpitations, two of which occurred during moderate intensity exercise involving cardio and weights. The episodes are described as sudden onset, regular, rapid heartbeats,reaching up to 200 bpm, and lasting approximately 30 minutes, resolving spontaneously without intervention. No associated chest pain, chest pressure, lightheadedness, syncope, or shortness of breath during or outside these events. She denies any clear triggers for the episodes. Outside of these occurrences, she maintains good functional capacity without cardiopulmonary symptoms. Cardiovascular exam is unremarkable. She exercises regularly (4x/week), does not smoke or use drugs, drinks alcohol occasionally, and uses oral contraceptive pills (amenorrheic). No family history of cardiac disease. TSH, potassium, and magnesium levels are normal.   Resting ECG was reviewed and shows normal sinus rhythm at ~65 bpm, normal AV conduction, no evidence of delta waves, and a normal QT interval.   The clinical presentation raises concern for paroxysmal supraventricular tachycardia (PSVT). No evidence of structural disease thus far, but further evaluation is warranted.  The plan would be to perform Exercise stress test to evaluate for exercise induced arrhythmia or ischemia. Holter monitor to capture and characterize rhythm during symptomatic episodes. Transthoracic echocardiogram to assess cardiac structure and function.  During the visit, diagnostic options and the clinical rationale for each study  were thoroughly discussed with the patient. We agreed that no pharmacologic treatment will be initiated until the diagnostic workup is complete. The patient was counseled on the importance of obtaining an ECG during an active episode to help confirm the diagnosis. Instruction was also provided on performing vagal maneuvers (Valsalva), which could potentially terminate an episode of PSVT if one recurs.    Recommendations:  - Stress test  - Holter monitor  - Echocardiogram  - If possible obtain an ECG during an active episode  - Instruction was provided on performing vagal maneuvers (Valsalva), which could potentially terminate an episode of arrhythmia  - Patient will follow up with me in the Cardiology office once the results are available  - I spent 45 minutes assessing the case between pre-charting, face-to-face patient interaction, and documentation    Tim Snyder MD        [1]   Family History  Problem Relation Name Age of Onset    Breast cancer Mother      Dementia Father      Hypertension Father

## 2025-07-16 ENCOUNTER — APPOINTMENT (OUTPATIENT)
Dept: CARDIOLOGY | Facility: CLINIC | Age: 40
End: 2025-07-16
Payer: COMMERCIAL

## 2025-07-18 ENCOUNTER — TELEPHONE (OUTPATIENT)
Dept: CARDIOLOGY | Facility: HOSPITAL | Age: 40
End: 2025-07-18
Payer: COMMERCIAL

## 2025-07-18 NOTE — NURSING NOTE
Instructions for exercise stress, exercise stress echo, dobutamine stress echo:    Spoke to: left message with instructions   Test: EST    Please arrive 15 minutes early to registration B  The test takes about 1 hour to complete; 1.5 hours for dobutamine stress echo   Wear comfortable clothing and if you are exercising wear appropriate comfortable shoes  No food 4 hours before your test, water is ok  No caffeine the day of your test  Please do not take your beta blocker, na , the morning of your test, unless instructed otherwise  Dr. Petty patients- always take beta blocker  Cardiac rehab patients- always take beta blocker  7. Diabetic patients- hold oral medication, na ; only take 1/2 insulin dose, na   8. You may take all your other medications, but we recommend you hold any water pills  9. Please do not use nicotine replacement or smoke 4 hours prior to test,na

## 2025-07-21 ENCOUNTER — APPOINTMENT (OUTPATIENT)
Dept: CARDIOLOGY | Facility: HOSPITAL | Age: 40
End: 2025-07-21
Payer: COMMERCIAL

## 2025-07-21 ENCOUNTER — HOSPITAL ENCOUNTER (OUTPATIENT)
Dept: CARDIOLOGY | Facility: HOSPITAL | Age: 40
Discharge: HOME | End: 2025-07-21
Payer: COMMERCIAL

## 2025-07-21 DIAGNOSIS — R00.2 PALPITATIONS: ICD-10-CM

## 2025-07-21 LAB
AORTIC VALVE MEAN GRADIENT: 7 MMHG
AORTIC VALVE PEAK VELOCITY: 1.72 M/S
AV PEAK GRADIENT: 12 MMHG
AVA (PEAK VEL): 2.59 CM2
AVA (VTI): 2.3 CM2
EJECTION FRACTION APICAL 4 CHAMBER: 65.5
EJECTION FRACTION: 67 %
LEFT ATRIUM VOLUME AREA LENGTH INDEX BSA: 31.3 ML/M2
LEFT VENTRICLE INTERNAL DIMENSION DIASTOLE: 4.7 CM (ref 3.5–6)
LEFT VENTRICULAR OUTFLOW TRACT DIAMETER: 2.02 CM
MITRAL VALVE E/A RATIO: 1.47
RIGHT VENTRICLE FREE WALL PEAK S': 15 CM/S
RIGHT VENTRICLE PEAK SYSTOLIC PRESSURE: 19 MMHG
TRICUSPID ANNULAR PLANE SYSTOLIC EXCURSION: 2.6 CM

## 2025-07-21 PROCEDURE — 93018 CV STRESS TEST I&R ONLY: CPT

## 2025-07-21 PROCEDURE — 93306 TTE W/DOPPLER COMPLETE: CPT

## 2025-07-21 PROCEDURE — 93016 CV STRESS TEST SUPVJ ONLY: CPT

## 2025-07-21 PROCEDURE — 93017 CV STRESS TEST TRACING ONLY: CPT

## 2025-07-22 ENCOUNTER — HOSPITAL ENCOUNTER (OUTPATIENT)
Dept: CARDIOLOGY | Facility: HOSPITAL | Age: 40
Discharge: HOME | End: 2025-07-22
Payer: COMMERCIAL

## 2025-07-22 DIAGNOSIS — R00.2 PALPITATIONS: ICD-10-CM

## 2025-07-22 PROCEDURE — 93246 EXT ECG>7D<15D RECORDING: CPT

## 2025-08-19 DIAGNOSIS — K21.00 GASTROESOPHAGEAL REFLUX DISEASE WITH ESOPHAGITIS WITHOUT HEMORRHAGE: ICD-10-CM

## 2025-08-19 RX ORDER — PANTOPRAZOLE SODIUM 40 MG/1
40 TABLET, DELAYED RELEASE ORAL
Qty: 90 TABLET | Refills: 1 | Status: SHIPPED | OUTPATIENT
Start: 2025-08-19

## 2025-08-27 ENCOUNTER — APPOINTMENT (OUTPATIENT)
Dept: CARDIOLOGY | Facility: CLINIC | Age: 40
End: 2025-08-27
Payer: COMMERCIAL

## 2025-08-27 VITALS
BODY MASS INDEX: 36.21 KG/M2 | DIASTOLIC BLOOD PRESSURE: 80 MMHG | HEART RATE: 70 BPM | OXYGEN SATURATION: 98 % | SYSTOLIC BLOOD PRESSURE: 120 MMHG | WEIGHT: 198 LBS

## 2025-08-27 DIAGNOSIS — R00.2 PALPITATIONS: Primary | ICD-10-CM

## 2025-08-27 PROCEDURE — 99214 OFFICE O/P EST MOD 30 MIN: CPT

## 2025-08-27 RX ORDER — METOPROLOL TARTRATE 25 MG/1
25 TABLET, FILM COATED ORAL AS NEEDED
Qty: 90 TABLET | Refills: 3 | Status: SHIPPED | OUTPATIENT
Start: 2025-08-27 | End: 2026-08-27

## 2025-09-08 ENCOUNTER — APPOINTMENT (OUTPATIENT)
Dept: PRIMARY CARE | Facility: CLINIC | Age: 40
End: 2025-09-08
Payer: COMMERCIAL

## 2025-09-10 ENCOUNTER — APPOINTMENT (OUTPATIENT)
Dept: CARDIOLOGY | Facility: CLINIC | Age: 40
End: 2025-09-10
Payer: COMMERCIAL

## 2025-10-03 ENCOUNTER — APPOINTMENT (OUTPATIENT)
Dept: RADIOLOGY | Facility: CLINIC | Age: 40
End: 2025-10-03
Payer: COMMERCIAL

## 2026-04-23 ENCOUNTER — APPOINTMENT (OUTPATIENT)
Dept: OBSTETRICS AND GYNECOLOGY | Facility: CLINIC | Age: 41
End: 2026-04-23
Payer: COMMERCIAL